# Patient Record
Sex: MALE | Race: OTHER | NOT HISPANIC OR LATINO | URBAN - METROPOLITAN AREA
[De-identification: names, ages, dates, MRNs, and addresses within clinical notes are randomized per-mention and may not be internally consistent; named-entity substitution may affect disease eponyms.]

---

## 2023-01-25 ENCOUNTER — EMERGENCY (EMERGENCY)
Facility: HOSPITAL | Age: 73
LOS: 1 days | Discharge: HOME | End: 2023-01-25
Admitting: EMERGENCY MEDICINE
Payer: MEDICAID

## 2023-01-25 VITALS
TEMPERATURE: 100 F | DIASTOLIC BLOOD PRESSURE: 90 MMHG | HEART RATE: 109 BPM | RESPIRATION RATE: 18 BRPM | OXYGEN SATURATION: 100 % | SYSTOLIC BLOOD PRESSURE: 127 MMHG

## 2023-01-25 VITALS
RESPIRATION RATE: 18 BRPM | DIASTOLIC BLOOD PRESSURE: 78 MMHG | HEART RATE: 102 BPM | SYSTOLIC BLOOD PRESSURE: 130 MMHG | OXYGEN SATURATION: 100 %

## 2023-01-25 PROCEDURE — 99284 EMERGENCY DEPT VISIT MOD MDM: CPT

## 2023-01-25 RX ORDER — OXYCODONE HYDROCHLORIDE 5 MG/1
5 TABLET ORAL ONCE
Refills: 0 | Status: DISCONTINUED | OUTPATIENT
Start: 2023-01-25 | End: 2023-01-25

## 2023-01-25 RX ORDER — OXYCODONE HYDROCHLORIDE 5 MG/1
1 TABLET ORAL
Qty: 15 | Refills: 0
Start: 2023-01-25

## 2023-01-25 RX ORDER — ACETAMINOPHEN 500 MG
650 TABLET ORAL ONCE
Refills: 0 | Status: COMPLETED | OUTPATIENT
Start: 2023-01-25 | End: 2023-01-25

## 2023-01-25 RX ADMIN — OXYCODONE HYDROCHLORIDE 5 MILLIGRAM(S): 5 TABLET ORAL at 14:05

## 2023-01-25 RX ADMIN — Medication 650 MILLIGRAM(S): at 14:05

## 2023-01-25 NOTE — ED PROVIDER NOTE - OBJECTIVE STATEMENT
72-year-old male history of CAD with stents (on Plavix) presents to ED complaining of right lower tooth pain x 2 days.  Patient states has had history of molar tooth problems however never had pain in this tooth.  Patient states pain is unbearable, has taken ibuprofen and Tylenol without any relief.  States has had a low-grade fever.  +Nausea.  Denies any CP, SOB, abdominal pain, vomiting, cough, sore throat.

## 2023-01-25 NOTE — ED PROVIDER NOTE - CLINICAL SUMMARY MEDICAL DECISION MAKING FREE TEXT BOX
72-year-old male history of CAD with stents (on Plavix) presents to ED complaining of right lower tooth pain x 2 days.   Noted to have percussion tenderness to right lower molar with submandibular tenderness, + low grade fever in ED. Dental consulted to r/o early dental abscess.  Per dental patient was noted to have dental fx, recommended to follow up outpatient for an extraction.  will dc home with antibiotics given low grade fever, pain meds and pt to follow up with dental.

## 2023-01-25 NOTE — ED PROVIDER NOTE - NSFOLLOWUPINSTRUCTIONS_ED_ALL_ED_FT
Take Augmentin 875 mg 2x/day for 7 days.  Take tylenol 975 mg every 6-8 hours.  Take oxycodone 5mg every 6-8 hours as needed for breakthrough pain.  Soft food diet.   Avoid hot or cold food.   Follow up with dental clinic- call for appointment.  Return to ED for any worsening pain, swelling or fever.

## 2023-01-25 NOTE — ED ADULT TRIAGE NOTE - CHIEF COMPLAINT QUOTE
Pt reporting to the ED for R lower tooth pain starting yesterday. Reports chills and nausea. pmh of HTN.

## 2023-01-25 NOTE — ED PROVIDER NOTE - PATIENT PORTAL LINK FT
You can access the FollowMyHealth Patient Portal offered by [facility] by registering at the following website: [email or web address]. By joining [employer]’s FollowMyHealth portal, you will also be able to view your health information using other applications (apps) compatible with our system.

## 2023-01-25 NOTE — ED ADULT NURSE NOTE - OBJECTIVE STATEMENT
Pt received in intake room 10A A&Ox4, ambulatory at baseline. Pt is a 71 y/o M with PMH of CAD on stents presents to ED with c/o right lower molar tooth pain, nausea, and fever x2 days. Pt reports history of dental problems but never had problems with this tooth before. Pt reports taking tylenol and motrin with minimal improvement of symptoms. Respirations even and unlabored, skin intact, VSS. Denies cp, vomiting, cough. Pt medicated as per MAR. Awaiting further provider orders

## 2023-01-25 NOTE — PROGRESS NOTE ADULT - SUBJECTIVE AND OBJECTIVE BOX
Patient is a 72y old  Male who presents with a chief complaint of dental pain    HPI: 73yo M with CAD p/w #31 dental pain ongoing for two days. He endorses that the pain is worse when laying down; on palpation and with cold water. Endorses that he felt chills at home and may have had a fever, but no temperature checked at home. Concerned about a possible "shaking" episode at home last night. Endorses that he has trouble sleeping with the pain and so came in for evaluation. Denies any chest pain, shortness of breath, cough, change in voice, hoarseness, neck swelling or other major changes.      PAST MEDICAL & SURGICAL HISTORY:  CAD (coronary artery disease)          MEDICATIONS  (STANDING):    MEDICATIONS  (PRN):      Allergies    iodine (Anaphylaxis)    Intolerances        *SOCIAL HISTORY: (guardian or who pt came with), Negative smoking history. Presents with son in law    Vital Signs Last 24 Hrs  T(C): 37.9 (25 Jan 2023 12:16), Max: 37.9 (25 Jan 2023 12:16)  T(F): 100.2 (25 Jan 2023 12:16), Max: 100.2 (25 Jan 2023 12:16)  HR: 102 (25 Jan 2023 14:07) (102 - 109)  BP: 130/78 (25 Jan 2023 14:07) (127/90 - 130/78)  BP(mean): --  RR: 18 (25 Jan 2023 14:07) (18 - 18)  SpO2: 100% (25 Jan 2023 14:07) (100% - 100%)    Parameters below as of 25 Jan 2023 14:07  Patient On (Oxygen Delivery Method): room air        EOE:  TMJ ( - ) clicks                    ( -  ) crepitus             Mandible FROM             Facial bones and MOM grossly intact             ( - ) trismus             ( - ) LAD             ( - ) swelling             ( - ) asymmetry             ( - ) palpation             ( - ) SOB             ( - ) dysphagia             ( - ) LOC    IOE:  permanent dentition: multiple missing teeth           hard/soft palate:  (  -) palatal torus           tongue/FOM WNL           labial/buccal mucosa WNL           ( +) percussion of 31           (  +) palpation of 31           (  -) swelling of gumline    Mobility: Class 2 mobility of 31      *DENTAL RADIOGRAPHS: Pan taken and interpreted; PARL on distal root of #31; gross caries of #1    ASSESSMENT:  73yo M with PMHx of HTN, CAD (on plavix) p/w #31 tooth pain; ongoing for two days:    - Pain is thought to be due to vertical root fracture; xrays obtained which do not show extensive carious disease of 31. No signs of abscess/cellulitis of the gum line or deeper tissues.  - Would recommend continuing NSAID and acetaminophen use for pain control at this time.  - Card for the dental clinic was issued to the patient; who will make an appointment for likely later this week or next week for extraction.  - OK for discharge from dental standpoint.    RECOMMENDATIONS:  1) OTC pain medication as per ED  2) F/U with dental   3) If any difficulty swallowing/breathing, fever occur, page dental.     Felisa Reddy DDS #[id number]